# Patient Record
Sex: FEMALE | Race: WHITE | Employment: OTHER | URBAN - METROPOLITAN AREA
[De-identification: names, ages, dates, MRNs, and addresses within clinical notes are randomized per-mention and may not be internally consistent; named-entity substitution may affect disease eponyms.]

---

## 2018-07-27 ENCOUNTER — HOSPITAL ENCOUNTER (OUTPATIENT)
Dept: OTHER | Age: 53
Discharge: HOME | End: 2018-07-27
Attending: SURGERY | Admitting: SURGERY

## 2018-07-27 DIAGNOSIS — N13.0 ACQUIRED HYDRONEPHROSIS WITH URETEROPELVIC JUNCTION (UPJ) OBSTRUCTION: ICD-10-CM

## 2022-06-03 ENCOUNTER — HOSPITAL ENCOUNTER (EMERGENCY)
Age: 57
Discharge: HOME | End: 2022-06-03
Attending: EMERGENCY MEDICINE
Payer: MEDICAID

## 2022-06-03 VITALS
SYSTOLIC BLOOD PRESSURE: 119 MMHG | RESPIRATION RATE: 16 BRPM | DIASTOLIC BLOOD PRESSURE: 75 MMHG | HEART RATE: 68 BPM | OXYGEN SATURATION: 100 %
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
## 2022-06-03 DIAGNOSIS — S09.90XA CLOSED HEAD INJURY, INITIAL ENCOUNTER: Primary | ICD-10-CM

## 2022-06-03 PROCEDURE — 6360000002 HC RX W HCPCS: Performed by: PHYSICIAN ASSISTANT

## 2022-06-03 PROCEDURE — 90471 IMMUNIZATION ADMIN: CPT | Performed by: PHYSICIAN ASSISTANT

## 2022-06-03 PROCEDURE — 90715 TDAP VACCINE 7 YRS/> IM: CPT | Performed by: PHYSICIAN ASSISTANT

## 2022-06-03 PROCEDURE — 99284 EMERGENCY DEPT VISIT MOD MDM: CPT

## 2022-06-03 RX ADMIN — TETANUS TOXOID, REDUCED DIPHTHERIA TOXOID AND ACELLULAR PERTUSSIS VACCINE, ADSORBED 0.5 ML: 5; 2.5; 8; 8; 2.5 SUSPENSION INTRAMUSCULAR at 12:10

## 2022-06-03 ASSESSMENT — ENCOUNTER SYMPTOMS
DIARRHEA: 0
NAUSEA: 0
COUGH: 0
EYES NEGATIVE: 1
SHORTNESS OF BREATH: 0
ABDOMINAL PAIN: 0
CONSTIPATION: 0

## 2022-06-03 ASSESSMENT — PAIN - FUNCTIONAL ASSESSMENT
PAIN_FUNCTIONAL_ASSESSMENT: 0-10
PAIN_FUNCTIONAL_ASSESSMENT: NONE - DENIES PAIN

## 2022-06-03 ASSESSMENT — PAIN SCALES - GENERAL: PAINLEVEL_OUTOF10: 3

## 2022-06-03 ASSESSMENT — PAIN DESCRIPTION - LOCATION: LOCATION: HEAD

## 2022-06-03 ASSESSMENT — PAIN DESCRIPTION - DESCRIPTORS: DESCRIPTORS: PRESSURE
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
## 2023-02-02 ENCOUNTER — APPOINTMENT (OUTPATIENT)
Dept: CT IMAGING | Age: 58
End: 2023-02-02
Payer: MEDICAID

## 2023-02-02 ENCOUNTER — HOSPITAL ENCOUNTER (EMERGENCY)
Age: 58
Discharge: HOME | End: 2023-02-02
Attending: EMERGENCY MEDICINE
Payer: MEDICAID

## 2023-02-02 VITALS
RESPIRATION RATE: 16 BRPM | TEMPERATURE: 98.2 F | SYSTOLIC BLOOD PRESSURE: 111 MMHG | DIASTOLIC BLOOD PRESSURE: 68 MMHG | HEART RATE: 74 BPM | WEIGHT: 160 LBS | OXYGEN SATURATION: 99 % | BODY MASS INDEX: 22.4 KG/M2 | HEIGHT: 71 IN

## 2023-02-02 DIAGNOSIS — Q62.11 HYDRONEPHROSIS WITH URETEROPELVIC JUNCTION (UPJ) OBSTRUCTION: ICD-10-CM

## 2023-02-02 DIAGNOSIS — N13.30 BILATERAL HYDRONEPHROSIS: Primary | ICD-10-CM

## 2023-02-02 PROBLEM — M70.61 TROCHANTERIC BURSITIS OF RIGHT HIP: Status: ACTIVE | Noted: 2022-08-17

## 2023-02-02 PROBLEM — F51.04 CHRONIC INSOMNIA: Status: ACTIVE | Noted: 2019-09-10

## 2023-02-02 PROBLEM — N13.5 UPJ (URETEROPELVIC JUNCTION) OBSTRUCTION: Status: ACTIVE | Noted: 2019-09-10

## 2023-02-02 LAB
A/G RATIO: 2.1 (ref 1.1–2.2)
ALBUMIN SERPL-MCNC: 4.7 G/DL (ref 3.4–5)
ALP BLD-CCNC: 82 U/L (ref 40–129)
ALT SERPL-CCNC: 14 U/L (ref 10–40)
ANION GAP SERPL CALCULATED.3IONS-SCNC: 10 MMOL/L (ref 3–16)
AST SERPL-CCNC: 24 U/L (ref 15–37)
BASOPHILS ABSOLUTE: 0 K/UL (ref 0–0.2)
BASOPHILS RELATIVE PERCENT: 1 %
BILIRUB SERPL-MCNC: 0.5 MG/DL (ref 0–1)
BUN BLDV-MCNC: 10 MG/DL (ref 7–20)
CALCIUM SERPL-MCNC: 9.8 MG/DL (ref 8.3–10.6)
CHLORIDE BLD-SCNC: 103 MMOL/L (ref 99–110)
CO2: 29 MMOL/L (ref 21–32)
CREAT SERPL-MCNC: 0.8 MG/DL (ref 0.6–1.1)
EOSINOPHILS ABSOLUTE: 0.1 K/UL (ref 0–0.6)
EOSINOPHILS RELATIVE PERCENT: 1.8 %
GFR SERPL CREATININE-BSD FRML MDRD: >60 ML/MIN/{1.73_M2}
GLUCOSE BLD-MCNC: 89 MG/DL (ref 70–99)
HCT VFR BLD CALC: 37.5 % (ref 36–48)
HEMOGLOBIN: 12.9 G/DL (ref 12–16)
LIPASE: 31 U/L (ref 13–60)
LYMPHOCYTES ABSOLUTE: 1.2 K/UL (ref 1–5.1)
LYMPHOCYTES RELATIVE PERCENT: 38.4 %
MCH RBC QN AUTO: 29.7 PG (ref 26–34)
MCHC RBC AUTO-ENTMCNC: 34.4 G/DL (ref 31–36)
MCV RBC AUTO: 86.2 FL (ref 80–100)
MONOCYTES ABSOLUTE: 0.2 K/UL (ref 0–1.3)
MONOCYTES RELATIVE PERCENT: 6.8 %
NEUTROPHILS ABSOLUTE: 1.6 K/UL (ref 1.7–7.7)
NEUTROPHILS RELATIVE PERCENT: 52 %
PDW BLD-RTO: 14.5 % (ref 12.4–15.4)
PLATELET # BLD: 197 K/UL (ref 135–450)
PMV BLD AUTO: 8.4 FL (ref 5–10.5)
POTASSIUM REFLEX MAGNESIUM: 4 MMOL/L (ref 3.5–5.1)
RBC # BLD: 4.35 M/UL (ref 4–5.2)
SODIUM BLD-SCNC: 142 MMOL/L (ref 136–145)
TOTAL PROTEIN: 6.9 G/DL (ref 6.4–8.2)
WBC # BLD: 3.1 K/UL (ref 4–11)

## 2023-02-02 PROCEDURE — 74177 CT ABD & PELVIS W/CONTRAST: CPT

## 2023-02-02 PROCEDURE — 2580000003 HC RX 258: Performed by: EMERGENCY MEDICINE

## 2023-02-02 PROCEDURE — 80053 COMPREHEN METABOLIC PANEL: CPT

## 2023-02-02 PROCEDURE — A4216 STERILE WATER/SALINE, 10 ML: HCPCS | Performed by: EMERGENCY MEDICINE

## 2023-02-02 PROCEDURE — 2500000003 HC RX 250 WO HCPCS: Performed by: EMERGENCY MEDICINE

## 2023-02-02 PROCEDURE — 96374 THER/PROPH/DIAG INJ IV PUSH: CPT

## 2023-02-02 PROCEDURE — 6370000000 HC RX 637 (ALT 250 FOR IP): Performed by: EMERGENCY MEDICINE

## 2023-02-02 PROCEDURE — 6360000004 HC RX CONTRAST MEDICATION: Performed by: EMERGENCY MEDICINE

## 2023-02-02 PROCEDURE — 85025 COMPLETE CBC W/AUTO DIFF WBC: CPT

## 2023-02-02 PROCEDURE — 83690 ASSAY OF LIPASE: CPT

## 2023-02-02 PROCEDURE — 99285 EMERGENCY DEPT VISIT HI MDM: CPT

## 2023-02-02 RX ADMIN — FAMOTIDINE 20 MG: 10 INJECTION, SOLUTION INTRAVENOUS at 04:43

## 2023-02-02 RX ADMIN — Medication: at 04:43

## 2023-02-02 RX ADMIN — IOPAMIDOL 80 ML: 755 INJECTION, SOLUTION INTRAVENOUS at 06:29

## 2023-02-02 ASSESSMENT — ENCOUNTER SYMPTOMS
DIARRHEA: 0
VOMITING: 0
ABDOMINAL PAIN: 1
ABDOMINAL DISTENTION: 0
ANAL BLEEDING: 0
NAUSEA: 1

## 2023-02-02 ASSESSMENT — PAIN DESCRIPTION - LOCATION: LOCATION: ABDOMEN

## 2023-02-02 ASSESSMENT — PAIN - FUNCTIONAL ASSESSMENT: PAIN_FUNCTIONAL_ASSESSMENT: ACTIVITIES ARE NOT PREVENTED

## 2023-02-02 ASSESSMENT — PAIN DESCRIPTION - FREQUENCY: FREQUENCY: INTERMITTENT

## 2023-02-02 ASSESSMENT — PAIN DESCRIPTION - PAIN TYPE: TYPE: ACUTE PAIN

## 2023-02-02 ASSESSMENT — PAIN DESCRIPTION - DESCRIPTORS: DESCRIPTORS: ACHING

## 2023-02-02 ASSESSMENT — PAIN SCALES - GENERAL: PAINLEVEL_OUTOF10: 4

## 2023-02-02 NOTE — ED PROVIDER NOTES
[address] [facility]          ATTENDING PHYSICIAN NOTE       Date of evaluation: 2/2/2023    ADDENDUM:      Care of this patient was assumed from Dr. Jeane Hi. The patient was seen for Abdominal Pain  . The patient's initial evaluation and plan have been discussed with the prior provider who initially evaluated the patient. Nursing Notes, Past Medical Hx, Past Surgical Hx, Social Hx, Allergies, and Family Hx were all reviewed. Diagnostic Results     RADIOLOGY:  CT ABDOMEN PELVIS W IV CONTRAST Additional Contrast? None   Final Result   1. Bilateral hydronephrosis, right greater than left. Bilateral UPJ stenoses.           LABS:   Results for orders placed or performed during the hospital encounter of 02/02/23   CBC with Auto Differential   Result Value Ref Range    WBC 3.1 (L) 4.0 - 11.0 K/uL    RBC 4.35 4.00 - 5.20 M/uL    Hemoglobin 12.9 12.0 - 16.0 g/dL    Hematocrit 37.5 36.0 - 48.0 %    MCV 86.2 80.0 - 100.0 fL    MCH 29.7 26.0 - 34.0 pg    MCHC 34.4 31.0 - 36.0 g/dL    RDW 14.5 12.4 - 15.4 %    Platelets 834 598 - 084 K/uL    MPV 8.4 5.0 - 10.5 fL    Neutrophils % 52.0 %    Lymphocytes % 38.4 %    Monocytes % 6.8 %    Eosinophils % 1.8 %    Basophils % 1.0 %    Neutrophils Absolute 1.6 (L) 1.7 - 7.7 K/uL    Lymphocytes Absolute 1.2 1.0 - 5.1 K/uL    Monocytes Absolute 0.2 0.0 - 1.3 K/uL    Eosinophils Absolute 0.1 0.0 - 0.6 K/uL    Basophils Absolute 0.0 0.0 - 0.2 K/uL   CMP w/ Reflex to MG   Result Value Ref Range    Sodium 142 136 - 145 mmol/L    Potassium reflex Magnesium 4.0 3.5 - 5.1 mmol/L    Chloride 103 99 - 110 mmol/L    CO2 29 21 - 32 mmol/L    Anion Gap 10 3 - 16    Glucose 89 70 - 99 mg/dL    BUN 10 7 - 20 mg/dL    Creatinine 0.8 0.6 - 1.1 mg/dL    Est, Glom Filt Rate >60 >60    Calcium 9.8 8.3 - 10.6 mg/dL    Total Protein 6.9 6.4 - 8.2 g/dL    Albumin 4.7 3.4 - 5.0 g/dL    Albumin/Globulin Ratio 2.1 1.1 - 2.2    Total Bilirubin 0.5 0.0 - 1.0 mg/dL    Alkaline Phosphatase 82 40 - 129 U/L    ALT 14 10 - 40 U/L    AST 24 15 - 37 U/L   Lipase   Result Value Ref Range    Lipase 31.0 13.0 - 60.0 U/L       RECENT VITALS:  BP: 125/76, Temp: 98.2 °F (36.8 °C), Heart Rate: 71, Resp: 18, SpO2: 100 %     ED Course          The patient was given the following medications:  Orders Placed This Encounter   Medications    aluminum & magnesium hydroxide-simethicone (MAALOX) 30 mL, lidocaine viscous hcl (XYLOCAINE) 5 mL (GI COCKTAIL)    famotidine (PEPCID) 20 mg in sodium chloride (PF) 0.9 % 10 mL injection    iopamidol (ISOVUE-370) 76 % injection 80 mL       CONSULTS:  KAREN [address] AZALEA Hinkle / KALPANA / Benjamin Paulo is a 62 y.o. female with a history of congenital UPJ obstruction presenting with abdominal discomfort. On lab evaluation, the patient's kidney function is stable but on bedside ultrasound she was noted to have fairly significant hydronephrosis which has been a problem in the past.  CT scan was obtained since 1 had not been done since 2018 and she appears to have fairly significant right-sided hydronephrosis and lesser hydronephrosis on the left. The patient has not seen urology in quite some time but given these findings, urology was consulted here in order to define a follow-up plan. Patient was also advised to start Pepcid Complete for the epigastric discomfort given nothing else is turned up on her evaluation. Clinical Impression     1. Bilateral hydronephrosis    2.  Hydronephrosis with ureteropelvic junction (UPJ) obstruction        Disposition     PATIENT REFERRED TO:  [facility]  [address] [facility] [address]  [address] [facility] [address]  [phone]  Call today  to schedule followup appointment    DISCHARGE MEDICATIONS:  Current Discharge Medication List          DISPOSITION Decision To Discharge 02/02/2023 08:47:28 AM         Jai Guevara MD  02/02/23 6498

## 2023-02-02 NOTE — DISCHARGE INSTRUCTIONS
You may use over-the-counter Pepcid Complete or Gaviscon or Tums to see if that may help your upper stomach pain.

## 2023-02-02 NOTE — ED PROVIDER NOTES
[address] [facility]          ATTENDING PHYSICIAN NOTE       Date of evaluation: 2/2/2023    Chief Complaint     Abdominal Pain      History of Present Illness     Shira Ernandez is a 62 y.o. female who presents with a complaint of epigastric pain. Patient states the pain is been getting progressively worse over the last 3 days and is worse at night. She does not eat after about 5 PM and states it is worse when she tries to go to sleep at night and lays flat. It is improved with sitting up. She denies any radiation of the chest, back, or associated nausea or vomiting. She states it feels gnawing and burning. She has used NSAIDs occasionally but not regularly. She has no history of gastric ulcers, or peptic ulcer disease. She denies associated hematochezia, or melena, or history of GI bleeding. Pain does not radiate to her chest and is not worse with inspiration    ASSESSMENT / PLAN  (MEDICAL DECISION MAKING)     INITIAL VITALS: BP: 123/82, Temp: 98.2 °F (36.8 °C), Heart Rate: 64, Resp: 16, SpO2: 99 %      Shira Ernandez is a 62 y.o. female presenting with epigastric abdominal pain. Pain is not improved with GI cocktail. Bedside ultrasound of her gallbladder showed mildly distended gallbladder with no stones, but with a large amount of right kidney hydronephrosis. She does note she has had hydronephrosis bilaterally prior, but does not have any prior imaging available for comparison in our system. Given this could be a cause of her acute abdominal pain, she was sent for CT to evaluate for possible renal obstruction, which has demonstrated bilateral hydronephrosis without kidney stone. Symptoms were initially treated with Pepcid and GI cocktail with minimal relief. Lipase, CMP, and CBC were unremarkable. She was signed out to Dr. Dipika Abdul pending urology consult given the hydronephrosis noted on her CT scan.     Medical Decision Making  Amount and/or Complexity of Data Reviewed  Labs: ordered. Radiology: ordered. Risk  Prescription drug management. Clinical Impression     1. Bilateral hydronephrosis    2. Hydronephrosis with ureteropelvic junction (UPJ) obstruction        Disposition     PATIENT REFERRED TO:  [facility]  [address] [facility] [address]  [address] [facility] [address]  [phone]  Call today  to schedule followup appointment    DISCHARGE MEDICATIONS:  Discharge Medication List as of 2/2/2023  9:13 AM          DISPOSITION Decision To Discharge 02/02/2023 08:47:28 AM        Diagnostic Results and Other Data       RADIOLOGY:  CT ABDOMEN PELVIS W IV CONTRAST Additional Contrast? None   Final Result   1. Bilateral hydronephrosis, right greater than left. Bilateral UPJ stenoses.           LABS:   Results for orders placed or performed during the hospital encounter of 02/02/23   CBC with Auto Differential   Result Value Ref Range    WBC 3.1 (L) 4.0 - 11.0 K/uL    RBC 4.35 4.00 - 5.20 M/uL    Hemoglobin 12.9 12.0 - 16.0 g/dL    Hematocrit 37.5 36.0 - 48.0 %    MCV 86.2 80.0 - 100.0 fL    MCH 29.7 26.0 - 34.0 pg    MCHC 34.4 31.0 - 36.0 g/dL    RDW 14.5 12.4 - 15.4 %    Platelets 430 019 - 745 K/uL    MPV 8.4 5.0 - 10.5 fL    Neutrophils % 52.0 %    Lymphocytes % 38.4 %    Monocytes % 6.8 %    Eosinophils % 1.8 %    Basophils % 1.0 %    Neutrophils Absolute 1.6 (L) 1.7 - 7.7 K/uL    Lymphocytes Absolute 1.2 1.0 - 5.1 K/uL    Monocytes Absolute 0.2 0.0 - 1.3 K/uL    Eosinophils Absolute 0.1 0.0 - 0.6 K/uL    Basophils Absolute 0.0 0.0 - 0.2 K/uL   CMP w/ Reflex to MG   Result Value Ref Range    Sodium 142 136 - 145 mmol/L    Potassium reflex Magnesium 4.0 3.5 - 5.1 mmol/L    Chloride 103 99 - 110 mmol/L    CO2 29 21 - 32 mmol/L    Anion Gap 10 3 - 16    Glucose 89 70 - 99 mg/dL    BUN 10 7 - 20 mg/dL    Creatinine 0.8 0.6 - 1.1 mg/dL    Est, Glom Filt Rate >60 >60    Calcium 9.8 8.3 - 10.6 mg/dL    Total Protein 6.9 6.4 - 8.2 g/dL    Albumin 4.7 3.4 - 5.0 g/dL    Albumin/Globulin Ratio 2.1 1.1 - 2.2    Total Bilirubin 0.5 0.0 - 1.0 mg/dL    Alkaline Phosphatase 82 40 - 129 U/L    ALT 14 10 - 40 U/L    AST 24 15 - 37 U/L   Lipase   Result Value Ref Range    Lipase 31.0 13.0 - 60.0 U/L       ED BEDSIDE ULTRASOUND:  See EMR    MOST RECENT VITALS:  BP: 111/68,Temp: 98.2 °F (36.8 °C), Heart Rate: 74, Resp: 16, SpO2: 99 %     Procedures         ED Course     Nursing Notes, Past Medical Hx, Past Surgical Hx, Social Hx,Allergies, and Family Hx were reviewed. The patient was given the following medications:  Orders Placed This Encounter   Medications    aluminum & magnesium hydroxide-simethicone (MAALOX) 30 mL, lidocaine viscous hcl (XYLOCAINE) 5 mL (GI COCKTAIL)    famotidine (PEPCID) 20 mg in sodium chloride (PF) 0.9 % 10 mL injection    iopamidol (ISOVUE-370) 76 % injection 80 mL       CONSULTS:  IP CONSULT TO UROLOGY    Review of Systems     Review of Systems   Constitutional:  Negative for chills and fatigue. Cardiovascular:  Negative for chest pain. Gastrointestinal:  Positive for abdominal pain and nausea. Negative for abdominal distention, anal bleeding, diarrhea and vomiting. All other systems reviewed and are negative. Past Medical, Surgical, Family, and Social History     She has a past medical history of Bladder prolapse, female, acquired. She has no past surgical history on file. Her family history is not on file. She reports that she has never smoked. She does not have any smokeless tobacco history on file. She reports current drug use. Drug: Marijuana Delicia Thornton). She reports that she does not drink alcohol.     Medications     Discharge Medication List as of 2/2/2023  9:13 AM        CONTINUE these medications which have NOT CHANGED    Details   omeprazole (PRILOSEC) 20 MG delayed release capsule Take 1 capsule by mouth daily, Disp-28 capsule, R-0Print      Multiple Vitamins-Minerals (THERAPEUTIC MULTIVITAMIN-MINERALS) tablet Take 1 tablet by mouth daily      Probiotic Product (PROBIOTIC & ACIDOPHILUS EX ST PO) Take 1 tablet by mouth daily      L-Tryptophan 500 MG CAPS Take 1 capsule by mouth daily      ascorbic acid (VITAMIN C) 500 MG tablet Take 500 mg by mouth daily      vitamin B-12 (CYANOCOBALAMIN) 100 MCG tablet Take 50 mcg by mouth daily      aspirin-acetaminophen-caffeine (EXCEDRIN MIGRAINE) 250-250-65 MG per tablet Take 1 tablet by mouth every 6 hours as needed for Headaches             Allergies     She is allergic to benadryl [diphenhydramine]. Physical Exam     INITIAL VITALS: BP: 123/82, Temp: 98.2 °F (36.8 °C), Heart Rate: 64, Resp: 16, SpO2: 99 %   Physical Exam    Constitutional: Well nourished well appearing middle aged female who appears in no acute distress. HEENT: NC/AT. PERRL 4-2 bilaterally. EOMI. CV:Heart is regular rate and rhythm without murmurs, rubs or gallops. Resp: Respirations unlabored. Lungs clear to auscultation w/o wheezing. Abd: Soft, nontender, nondistended. No rebound or guarding noted. MSK: Full ROM, no edema or tenderness to palpation. Skin: Extremities are warm and well perfused. 2+ radial pulses . Cap Refill <3 seconds. Neuro: A&Ox3. Cranial nerves grossly intact   Strength and sensation intact x4 extremities. Psych:  Thought content, behavior & judgement normal.         Stevie Tran MD  02/02/23 [id number]

## 2023-07-15 ENCOUNTER — APPOINTMENT (OUTPATIENT)
Dept: GENERAL RADIOLOGY | Age: 58
End: 2023-07-15
Payer: MEDICAID

## 2023-07-15 ENCOUNTER — HOSPITAL ENCOUNTER (EMERGENCY)
Age: 58
Discharge: HOME | End: 2023-07-15
Attending: EMERGENCY MEDICINE
Payer: MEDICAID

## 2023-07-15 VITALS
HEART RATE: 70 BPM | OXYGEN SATURATION: 100 % | DIASTOLIC BLOOD PRESSURE: 73 MMHG | RESPIRATION RATE: 14 BRPM | TEMPERATURE: 98 F | HEIGHT: 71 IN | SYSTOLIC BLOOD PRESSURE: 117 MMHG | BODY MASS INDEX: 21.84 KG/M2 | WEIGHT: 156 LBS

## 2023-07-15 DIAGNOSIS — R07.9 CHEST PAIN, UNSPECIFIED TYPE: Primary | ICD-10-CM

## 2023-07-15 LAB
ANION GAP SERPL CALCULATED.3IONS-SCNC: 9 MMOL/L (ref 3–16)
BASOPHILS # BLD: 0 K/UL (ref 0–0.2)
BASOPHILS NFR BLD: 1.4 %
BUN SERPL-MCNC: 12 MG/DL (ref 7–20)
CALCIUM SERPL-MCNC: 9.5 MG/DL (ref 8.3–10.6)
CHLORIDE SERPL-SCNC: 101 MMOL/L (ref 99–110)
CO2 SERPL-SCNC: 26 MMOL/L (ref 21–32)
CREAT SERPL-MCNC: 0.6 MG/DL (ref 0.6–1.1)
D DIMER: 0.34 UG/ML FEU (ref 0–0.6)
DEPRECATED RDW RBC AUTO: 14.4 % (ref 12.4–15.4)
EKG ATRIAL RATE: 73 BPM
EKG DIAGNOSIS: NORMAL
EKG P AXIS: 70 DEGREES
EKG P-R INTERVAL: 220 MS
EKG Q-T INTERVAL: 394 MS
EKG QRS DURATION: 78 MS
EKG QTC CALCULATION (BAZETT): 434 MS
EKG R AXIS: 48 DEGREES
EKG T AXIS: 48 DEGREES
EKG VENTRICULAR RATE: 73 BPM
EOSINOPHIL # BLD: 0.1 K/UL (ref 0–0.6)
EOSINOPHIL NFR BLD: 1.9 %
GFR SERPLBLD CREATININE-BSD FMLA CKD-EPI: >60 ML/MIN/{1.73_M2}
GLUCOSE SERPL-MCNC: 93 MG/DL (ref 70–99)
HCT VFR BLD AUTO: 37.9 % (ref 36–48)
HGB BLD-MCNC: 13 G/DL (ref 12–16)
LYMPHOCYTES # BLD: 1.5 K/UL (ref 1–5.1)
LYMPHOCYTES NFR BLD: 45.3 %
MCH RBC QN AUTO: 29.6 PG (ref 26–34)
MCHC RBC AUTO-ENTMCNC: 34.4 G/DL (ref 31–36)
MCV RBC AUTO: 86.1 FL (ref 80–100)
MONOCYTES # BLD: 0.2 K/UL (ref 0–1.3)
MONOCYTES NFR BLD: 6.4 %
NEUTROPHILS # BLD: 1.5 K/UL (ref 1.7–7.7)
NEUTROPHILS NFR BLD: 45 %
PLATELET # BLD AUTO: 263 K/UL (ref 135–450)
PMV BLD AUTO: 8.4 FL (ref 5–10.5)
POTASSIUM SERPL-SCNC: 4 MMOL/L (ref 3.5–5.1)
RBC # BLD AUTO: 4.41 M/UL (ref 4–5.2)
SODIUM SERPL-SCNC: 136 MMOL/L (ref 136–145)
TROPONIN, HIGH SENSITIVITY: <6 NG/L (ref 0–14)
TROPONIN, HIGH SENSITIVITY: <6 NG/L (ref 0–14)
WBC # BLD AUTO: 3.2 K/UL (ref 4–11)

## 2023-07-15 PROCEDURE — 85379 FIBRIN DEGRADATION QUANT: CPT

## 2023-07-15 PROCEDURE — 84484 ASSAY OF TROPONIN QUANT: CPT

## 2023-07-15 PROCEDURE — 93005 ELECTROCARDIOGRAM TRACING: CPT | Performed by: EMERGENCY MEDICINE

## 2023-07-15 PROCEDURE — 6360000002 HC RX W HCPCS: Performed by: EMERGENCY MEDICINE

## 2023-07-15 PROCEDURE — 99285 EMERGENCY DEPT VISIT HI MDM: CPT

## 2023-07-15 PROCEDURE — 85025 COMPLETE CBC W/AUTO DIFF WBC: CPT

## 2023-07-15 PROCEDURE — 36415 COLL VENOUS BLD VENIPUNCTURE: CPT

## 2023-07-15 PROCEDURE — 80048 BASIC METABOLIC PNL TOTAL CA: CPT

## 2023-07-15 PROCEDURE — 96374 THER/PROPH/DIAG INJ IV PUSH: CPT

## 2023-07-15 PROCEDURE — 71046 X-RAY EXAM CHEST 2 VIEWS: CPT

## 2023-07-15 RX ORDER — ASPIRIN 81 MG/1
324 TABLET, CHEWABLE ORAL ONCE
Status: DISCONTINUED | OUTPATIENT
Start: 2023-07-15 | End: 2023-07-15 | Stop reason: HOSPADM

## 2023-07-15 RX ORDER — KETOROLAC TROMETHAMINE 30 MG/ML
15 INJECTION, SOLUTION INTRAMUSCULAR; INTRAVENOUS ONCE
Status: COMPLETED | OUTPATIENT
Start: 2023-07-15 | End: 2023-07-15

## 2023-07-15 RX ADMIN — KETOROLAC TROMETHAMINE 15 MG: 30 INJECTION, SOLUTION INTRAMUSCULAR; INTRAVENOUS at 10:45

## 2023-07-15 ASSESSMENT — ENCOUNTER SYMPTOMS
SHORTNESS OF BREATH: 0
VOMITING: 0
ABDOMINAL PAIN: 0
EYE PAIN: 0
WHEEZING: 0
DIARRHEA: 0
COUGH: 0
NAUSEA: 0

## 2023-07-15 ASSESSMENT — PAIN SCALES - GENERAL
PAINLEVEL_OUTOF10: 5

## 2023-07-15 ASSESSMENT — PAIN - FUNCTIONAL ASSESSMENT: PAIN_FUNCTIONAL_ASSESSMENT: 0-10

## 2023-07-15 ASSESSMENT — PAIN DESCRIPTION - LOCATION
LOCATION: CHEST;BACK
LOCATION: CHEST;BACK

## 2023-07-15 ASSESSMENT — PAIN DESCRIPTION - DESCRIPTORS
DESCRIPTORS: ACHING
DESCRIPTORS: DULL;ACHING

## 2023-07-15 ASSESSMENT — PAIN DESCRIPTION - ORIENTATION
ORIENTATION: LEFT;UPPER
ORIENTATION: LEFT;UPPER

## 2023-07-15 NOTE — ED NOTES
Pt condition and vital signs stable at discharge, pt verbalized understanding of discharge instructions.       [place], [place]  07/15/23 0076

## 2024-02-04 ENCOUNTER — APPOINTMENT (OUTPATIENT)
Dept: GENERAL RADIOLOGY | Age: 59
End: 2024-02-04
Payer: MEDICAID

## 2024-02-04 ENCOUNTER — APPOINTMENT (OUTPATIENT)
Dept: CT IMAGING | Age: 59
End: 2024-02-04
Payer: MEDICAID

## 2024-02-04 ENCOUNTER — ANCILLARY PROCEDURE (OUTPATIENT)
Dept: EMERGENCY DEPT | Age: 59
End: 2024-02-04
Attending: STUDENT IN AN ORGANIZED HEALTH CARE EDUCATION/TRAINING PROGRAM
Payer: MEDICAID

## 2024-02-04 ENCOUNTER — HOSPITAL ENCOUNTER (EMERGENCY)
Age: 59
Discharge: HOME | End: 2024-02-04
Attending: STUDENT IN AN ORGANIZED HEALTH CARE EDUCATION/TRAINING PROGRAM
Payer: MEDICAID

## 2024-02-04 VITALS
RESPIRATION RATE: 18 BRPM | HEART RATE: 83 BPM | SYSTOLIC BLOOD PRESSURE: 118 MMHG | OXYGEN SATURATION: 95 % | DIASTOLIC BLOOD PRESSURE: 72 MMHG | WEIGHT: 160 LBS | TEMPERATURE: 99.1 F | BODY MASS INDEX: 22.32 KG/M2

## 2024-02-04 DIAGNOSIS — J10.1 INFLUENZA A: Primary | ICD-10-CM

## 2024-02-04 LAB
ANION GAP SERPL CALCULATED.3IONS-SCNC: 12 MMOL/L (ref 3–16)
BASOPHILS # BLD: 0 K/UL (ref 0–0.2)
BASOPHILS NFR BLD: 0.6 %
BILIRUB UR QL STRIP.AUTO: ABNORMAL
BUN SERPL-MCNC: 10 MG/DL (ref 7–20)
CALCIUM SERPL-MCNC: 9.1 MG/DL (ref 8.3–10.6)
CHLORIDE SERPL-SCNC: 93 MMOL/L (ref 99–110)
CLARITY UR: CLEAR
CO2 SERPL-SCNC: 25 MMOL/L (ref 21–32)
COLOR UR: YELLOW
CREAT SERPL-MCNC: 0.8 MG/DL (ref 0.6–1.1)
DEPRECATED RDW RBC AUTO: 14.6 % (ref 12.4–15.4)
EOSINOPHIL # BLD: 0 K/UL (ref 0–0.6)
EOSINOPHIL NFR BLD: 0.3 %
FLUAV RNA RESP QL NAA+PROBE: DETECTED
FLUBV RNA RESP QL NAA+PROBE: NOT DETECTED
GFR SERPLBLD CREATININE-BSD FMLA CKD-EPI: >60 ML/MIN/{1.73_M2}
GLUCOSE SERPL-MCNC: 93 MG/DL (ref 70–99)
GLUCOSE UR STRIP.AUTO-MCNC: NEGATIVE MG/DL
HCT VFR BLD AUTO: 39.1 % (ref 36–48)
HGB BLD-MCNC: 13.3 G/DL (ref 12–16)
HGB UR QL STRIP.AUTO: NEGATIVE
KETONES UR STRIP.AUTO-MCNC: >=160 MG/DL
LEUKOCYTE ESTERASE UR QL STRIP.AUTO: NEGATIVE
LYMPHOCYTES # BLD: 0.5 K/UL (ref 1–5.1)
LYMPHOCYTES NFR BLD: 17 %
MCH RBC QN AUTO: 29.5 PG (ref 26–34)
MCHC RBC AUTO-ENTMCNC: 34.1 G/DL (ref 31–36)
MCV RBC AUTO: 86.5 FL (ref 80–100)
MONOCYTES # BLD: 0.2 K/UL (ref 0–1.3)
MONOCYTES NFR BLD: 6.8 %
NEUTROPHILS # BLD: 2 K/UL (ref 1.7–7.7)
NEUTROPHILS NFR BLD: 75.3 %
NITRITE UR QL STRIP.AUTO: NEGATIVE
PH UR STRIP.AUTO: 6 [PH] (ref 5–8)
PLATELET # BLD AUTO: 174 K/UL (ref 135–450)
PMV BLD AUTO: 8.7 FL (ref 5–10.5)
POTASSIUM SERPL-SCNC: 3.8 MMOL/L (ref 3.5–5.1)
PROT UR STRIP.AUTO-MCNC: NEGATIVE MG/DL
RBC # BLD AUTO: 4.52 M/UL (ref 4–5.2)
SARS-COV-2 RNA RESP QL NAA+PROBE: NOT DETECTED
SODIUM SERPL-SCNC: 130 MMOL/L (ref 136–145)
SP GR UR STRIP.AUTO: 1.02 (ref 1–1.03)
UA COMPLETE W REFLEX CULTURE PNL UR: ABNORMAL
UA DIPSTICK W REFLEX MICRO PNL UR: ABNORMAL
URN SPEC COLLECT METH UR: ABNORMAL
UROBILINOGEN UR STRIP-ACNC: 0.2 E.U./DL
WBC # BLD AUTO: 2.7 K/UL (ref 4–11)

## 2024-02-04 PROCEDURE — 2580000003 HC RX 258: Performed by: STUDENT IN AN ORGANIZED HEALTH CARE EDUCATION/TRAINING PROGRAM

## 2024-02-04 PROCEDURE — 81003 URINALYSIS AUTO W/O SCOPE: CPT

## 2024-02-04 PROCEDURE — 6370000000 HC RX 637 (ALT 250 FOR IP): Performed by: STUDENT IN AN ORGANIZED HEALTH CARE EDUCATION/TRAINING PROGRAM

## 2024-02-04 PROCEDURE — 80048 BASIC METABOLIC PNL TOTAL CA: CPT

## 2024-02-04 PROCEDURE — 99284 EMERGENCY DEPT VISIT MOD MDM: CPT

## 2024-02-04 PROCEDURE — 87636 SARSCOV2 & INF A&B AMP PRB: CPT

## 2024-02-04 PROCEDURE — 71046 X-RAY EXAM CHEST 2 VIEWS: CPT

## 2024-02-04 PROCEDURE — 85025 COMPLETE CBC W/AUTO DIFF WBC: CPT

## 2024-02-04 PROCEDURE — 76775 US EXAM ABDO BACK WALL LIM: CPT

## 2024-02-04 RX ORDER — SODIUM CHLORIDE, SODIUM LACTATE, POTASSIUM CHLORIDE, AND CALCIUM CHLORIDE .6; .31; .03; .02 G/100ML; G/100ML; G/100ML; G/100ML
1000 INJECTION, SOLUTION INTRAVENOUS ONCE
Status: COMPLETED | OUTPATIENT
Start: 2024-02-04 | End: 2024-02-04

## 2024-02-04 RX ORDER — ACETAMINOPHEN 500 MG
1000 TABLET ORAL ONCE
Status: COMPLETED | OUTPATIENT
Start: 2024-02-04 | End: 2024-02-04

## 2024-02-04 RX ADMIN — ACETAMINOPHEN 1000 MG: 500 TABLET ORAL at 11:26

## 2024-02-04 RX ADMIN — SODIUM CHLORIDE, SODIUM LACTATE, POTASSIUM CHLORIDE, AND CALCIUM CHLORIDE 1000 ML: .6; .31; .03; .02 INJECTION, SOLUTION INTRAVENOUS at 11:36

## 2024-02-04 NOTE — DISCHARGE INSTRUCTIONS
CDC guidelines recommend that you isolate until 24 hours after your fever clears.  Please use Tylenol Motrin at home for your fever and please keep yourself well-hydrated.

## 2024-02-04 NOTE — ED TRIAGE NOTES
Patient presents to ED with concerns for kidney issues, states \"my kidneys are hurting and I have a history of hydronephrosis\". States daughter and her children as well as her self were diagnosed with COVID \"several weeks ago\". States it came on Friday \"very suddenly, like I was having an allergic reaction\"

## 2024-02-04 NOTE — ED NOTES
Patient prepared for and ready to be discharged. Patient discharged at this time in no acute distress after verbalizing understanding of discharge instructions. Patient left after receiving After Visit Summary instructions.       Juan Manuel Elizalde RN  02/04/24 [id number]

## 2024-02-04 NOTE — ED PROVIDER NOTES
[facility]  EMERGENCY DEPARTMENT ENCOUNTER          ATTENDING PHYSICIAN NOTE       Date of evaluation: 2/4/2024    Chief Complaint     Pharyngitis and Fever (102.1)      History of Present Illness     Aiyana Evans is a 58 y.o. female who presents with cough, headache, sore throat, back pain for the past 2 days.  Patient reports that she was out to dinner a couple days ago when she developed the symptoms.  She also states that she had a fever of 102 at home.  States that she has a history of kidney problems and states that her kidneys are hurting but she is not having any hematuria, dysuria, decreased urine output at this time.  She states that she had COVID last month.  She does not currently have any sick contacts.      Physical Exam     INITIAL VITALS: BP: (!) 150/85, Temp: (!) 100.8 °F (38.2 °C), Pulse: (!) 105, Respirations: 19, SpO2: 97 %   Physical Exam  Vitals and nursing note reviewed.   Constitutional:       General: She is not in acute distress.     Appearance: Normal appearance. She is normal weight.   HENT:      Head: Normocephalic and atraumatic.   Cardiovascular:      Rate and Rhythm: Tachycardia present.   Abdominal:      General: Abdomen is flat.      Tenderness: There is no abdominal tenderness. There is no guarding or rebound.   Skin:     General: Skin is warm and dry.   Neurological:      General: No focal deficit present.      Mental Status: She is alert. Mental status is at baseline.   Psychiatric:         Mood and Affect: Mood normal.         Behavior: Behavior normal.         ASSESSMENT / PLAN  (MEDICAL DECISION MAKING)     INITIAL VITALS: BP: (!) 150/85, Temp: (!) 100.8 °F (38.2 °C), Pulse: (!) 105, Respirations: 19, SpO2: 97 %      Aiyana Evans is a 58 y.o. female who presents with fever, cough, headache, back pain.  Patient is flu positive.  Chest x-ray, CBC, BMP are unremarkable.  Patient reports that she has a history of hydronephrosis I performed bedside ultrasound which did not

## 2024-02-08 RX ORDER — ALBUTEROL SULFATE 2.5 MG/3ML
SOLUTION RESPIRATORY (INHALATION)
Status: DISPENSED
Start: 2024-02-08 | End: 2024-02-08

## 2024-05-06 ENCOUNTER — HOSPITAL ENCOUNTER (EMERGENCY)
Age: 59
Discharge: HOME | End: 2024-05-06
Payer: MEDICAID

## 2024-05-06 VITALS
DIASTOLIC BLOOD PRESSURE: 83 MMHG | BODY MASS INDEX: 22.87 KG/M2 | WEIGHT: 163.36 LBS | SYSTOLIC BLOOD PRESSURE: 148 MMHG | OXYGEN SATURATION: 100 % | HEIGHT: 71 IN | HEART RATE: 78 BPM | RESPIRATION RATE: 16 BRPM | TEMPERATURE: 97.9 F

## 2024-05-06 DIAGNOSIS — S00.03XA CONTUSION OF SCALP, INITIAL ENCOUNTER: Primary | ICD-10-CM

## 2024-05-06 PROCEDURE — 99282 EMERGENCY DEPT VISIT SF MDM: CPT

## 2024-05-06 RX ORDER — MAGNESIUM OXIDE 400 MG/1
400 TABLET ORAL 2 TIMES DAILY
COMMUNITY

## 2024-05-06 ASSESSMENT — PAIN DESCRIPTION - PAIN TYPE: TYPE: ACUTE PAIN

## 2024-05-06 ASSESSMENT — PAIN - FUNCTIONAL ASSESSMENT: PAIN_FUNCTIONAL_ASSESSMENT: 0-10

## 2024-05-06 ASSESSMENT — ENCOUNTER SYMPTOMS
RHINORRHEA: 0
SHORTNESS OF BREATH: 0
SORE THROAT: 0
ABDOMINAL PAIN: 0
VOMITING: 0
COUGH: 0
NAUSEA: 0
PHOTOPHOBIA: 1

## 2024-05-06 ASSESSMENT — PAIN DESCRIPTION - FREQUENCY: FREQUENCY: CONTINUOUS

## 2024-05-06 ASSESSMENT — PAIN DESCRIPTION - ONSET: ONSET: SUDDEN

## 2024-05-06 ASSESSMENT — PAIN DESCRIPTION - LOCATION: LOCATION: HEAD

## 2024-05-06 ASSESSMENT — PAIN DESCRIPTION - DESCRIPTORS: DESCRIPTORS: PRESSURE

## 2024-05-06 ASSESSMENT — PAIN SCALES - GENERAL: PAINLEVEL_OUTOF10: 2

## 2024-05-06 NOTE — ED PROVIDER NOTES
[facility]  EMERGENCY DEPARTMENT ENCOUNTER          PHYSICIAN ASSISTANT NOTE       Date of evaluation: 5/6/2024    Chief Complaint     Head Injury (Sat hit in the head with a beer hayward no LOC today pressure in eyes and light sensitivity)      History of Present Illness     Aiyana Evans is a 58 y.o. female, otherwise healthy, who presents to the ED following a minor head injury 2 days ago.  The patient states she was standing under a shelf when her client's cat jumped from the shelf to the nearby refrigerator causing beer yesy to fall off the counter and onto the top of her head.  She did not lose consciousness but reports pain initially.  No break in the skin.  She felt okay yesterday but today started noticing mild headache at the top of her head as well as photophobia causing her to wear her sunglasses even though it is a cloudy day.  She reports aching discomfort with movement of her eyes and when she is under more stress.  Has had no nausea or vomiting.  Rates her pain a 2 out of 10 in severity.  She otherwise has no complaints.    ASSESSMENT / PLAN  (MEDICAL DECISION MAKING)     INITIAL VITALS: BP: (!) 148/83, Temp: 97.9 °F (36.6 °C), Pulse: 78, Respirations: 16, SpO2: 100 %    Aiyana Evans is a 58 y.o. female presenting following a mild head injury with signs of mild concussion, no neurologic deficits on exam.  She does not meet criteria for CT imaging.  The patient is reassured.  She is encouraged to take Tylenol as needed for headache and push fluids.  She was given 3 days off work and recommended brain rest.  She is given additional head injury instructions and will follow-up with her primary care provider in 3 days if no improvement or return for worsening.      Medical Decision Making  Problems Addressed:  Contusion of scalp, initial encounter: acute illness or injury    Risk  OTC drugs.      Clinical Impression     1. Contusion of scalp, initial encounter        Disposition     PATIENT

## 2024-05-11 ENCOUNTER — HOSPITAL ENCOUNTER (EMERGENCY)
Age: 59
Discharge: HOME | End: 2024-05-11
Attending: EMERGENCY MEDICINE
Payer: MEDICAID

## 2024-05-11 ENCOUNTER — APPOINTMENT (OUTPATIENT)
Dept: CT IMAGING | Age: 59
End: 2024-05-11
Payer: MEDICAID

## 2024-05-11 VITALS
DIASTOLIC BLOOD PRESSURE: 57 MMHG | HEART RATE: 76 BPM | OXYGEN SATURATION: 96 % | TEMPERATURE: 98 F | BODY MASS INDEX: 22.75 KG/M2 | RESPIRATION RATE: 17 BRPM | SYSTOLIC BLOOD PRESSURE: 101 MMHG | WEIGHT: 163.14 LBS

## 2024-05-11 DIAGNOSIS — R51.9 NONINTRACTABLE EPISODIC HEADACHE, UNSPECIFIED HEADACHE TYPE: Primary | ICD-10-CM

## 2024-05-11 PROCEDURE — 70450 CT HEAD/BRAIN W/O DYE: CPT

## 2024-05-11 PROCEDURE — 2580000003 HC RX 258: Performed by: STUDENT IN AN ORGANIZED HEALTH CARE EDUCATION/TRAINING PROGRAM

## 2024-05-11 PROCEDURE — 6360000002 HC RX W HCPCS: Performed by: STUDENT IN AN ORGANIZED HEALTH CARE EDUCATION/TRAINING PROGRAM

## 2024-05-11 PROCEDURE — 99284 EMERGENCY DEPT VISIT MOD MDM: CPT

## 2024-05-11 PROCEDURE — 96372 THER/PROPH/DIAG INJ SC/IM: CPT

## 2024-05-11 PROCEDURE — 93005 ELECTROCARDIOGRAM TRACING: CPT | Performed by: STUDENT IN AN ORGANIZED HEALTH CARE EDUCATION/TRAINING PROGRAM

## 2024-05-11 PROCEDURE — 6370000000 HC RX 637 (ALT 250 FOR IP): Performed by: STUDENT IN AN ORGANIZED HEALTH CARE EDUCATION/TRAINING PROGRAM

## 2024-05-11 PROCEDURE — 96374 THER/PROPH/DIAG INJ IV PUSH: CPT

## 2024-05-11 RX ORDER — PROCHLORPERAZINE EDISYLATE 5 MG/ML
5 INJECTION INTRAMUSCULAR; INTRAVENOUS ONCE
Status: COMPLETED | OUTPATIENT
Start: 2024-05-11 | End: 2024-05-11

## 2024-05-11 RX ORDER — KETOROLAC TROMETHAMINE 30 MG/ML
15 INJECTION, SOLUTION INTRAMUSCULAR; INTRAVENOUS ONCE
Status: COMPLETED | OUTPATIENT
Start: 2024-05-11 | End: 2024-05-11

## 2024-05-11 RX ORDER — BUTALBITAL, ACETAMINOPHEN AND CAFFEINE 50; 325; 40 MG/1; MG/1; MG/1
1 TABLET ORAL EVERY 4 HOURS PRN
Qty: 15 TABLET | Refills: 0 | Status: SHIPPED | OUTPATIENT
Start: 2024-05-11

## 2024-05-11 RX ORDER — SODIUM CHLORIDE, SODIUM LACTATE, POTASSIUM CHLORIDE, AND CALCIUM CHLORIDE .6; .31; .03; .02 G/100ML; G/100ML; G/100ML; G/100ML
500 INJECTION, SOLUTION INTRAVENOUS ONCE
Status: COMPLETED | OUTPATIENT
Start: 2024-05-11 | End: 2024-05-11

## 2024-05-11 RX ORDER — BUTALBITAL, ACETAMINOPHEN AND CAFFEINE 50; 325; 40 MG/1; MG/1; MG/1
1 TABLET ORAL ONCE
Status: COMPLETED | OUTPATIENT
Start: 2024-05-11 | End: 2024-05-11

## 2024-05-11 RX ADMIN — SODIUM CHLORIDE, POTASSIUM CHLORIDE, SODIUM LACTATE AND CALCIUM CHLORIDE 500 ML: 600; 310; 30; 20 INJECTION, SOLUTION INTRAVENOUS at 12:24

## 2024-05-11 RX ADMIN — KETOROLAC TROMETHAMINE 15 MG: 30 INJECTION, SOLUTION INTRAMUSCULAR; INTRAVENOUS at 12:17

## 2024-05-11 RX ADMIN — PROCHLORPERAZINE EDISYLATE 5 MG: 5 INJECTION INTRAMUSCULAR; INTRAVENOUS at 12:18

## 2024-05-11 RX ADMIN — BUTALBITAL, ACETAMINOPHEN, AND CAFFEINE 1 TABLET: 50; 325; 40 TABLET ORAL at 13:16

## 2024-05-11 ASSESSMENT — PAIN DESCRIPTION - DESCRIPTORS
DESCRIPTORS: ACHING;DULL
DESCRIPTORS: PRESSURE

## 2024-05-11 ASSESSMENT — PAIN SCALES - GENERAL
PAINLEVEL_OUTOF10: 2
PAINLEVEL_OUTOF10: 4

## 2024-05-11 ASSESSMENT — PAIN DESCRIPTION - ORIENTATION
ORIENTATION: ANTERIOR
ORIENTATION: ANTERIOR

## 2024-05-11 ASSESSMENT — PAIN DESCRIPTION - LOCATION
LOCATION: HEAD
LOCATION: HEAD;JAW

## 2024-05-11 NOTE — ED PROVIDER NOTES
ED Attending Attestation Note     Date of evaluation: 5/11/2024    This patient was seen by the resident.  I have seen and examined the patient, agree with the workup, evaluation, management and diagnosis. The care plan has been discussed.  My assessment reveals is awake and alert, ambulatory without difficulty.  Face symmetric, speech clear, no meningismus.       Slick Urbano MD  05/11/24 0761

## 2024-05-11 NOTE — ED PROVIDER NOTES
[facility]  EMERGENCY DEPARTMENT ENCOUNTER          EM RESIDENT NOTE       Date of evaluation: 5/11/2024    Chief Complaint     Headache (Headache since Saturday, worse today, states she thinks she took \"too much iodine\" and she had a \"head injury\" last week where a beer hayward fell on her head)      History of Present Illness     Aiyana Evans is a 58 y.o. female who presents with a headache. She states that she woke up this morning with a headache, as she has since her injury last week. She states that the headache was worse today than it has been before. It is a holocephalic headache that is throbbing, worse when laying flat (pedro luis in morning), and associated with nausea, photophobia and phonophobia. She also felt like she \"spaced out\" this morning while in the bath (did not syncopize) and was cognitively foggy. Family has not commented on any confusion or abnormal behavior. She denies any vomiting, numbness, weakness, dyscoordination, visual changes, hearing changes. She took an excedrine migraine this morning.     The patient thinks that this morning she may have accidentally taken a full eyedropper of iodine rather than the usual 2 drops because she takes a full eye dropper of a different supplement, but she is unsure of this. She denies any irritation of the mouth, throat, dyspepsia, abdominal pain. She does not have a metallic taste in her mouth.     MEDICAL DECISION MAKING / ASSESSMENT / PLAN     INITIAL VITALS: BP: (!) 141/76, Temp: 98 °F (36.7 °C), Pulse: 90,  , SpO2: 100 %    Aiyana Evans is a 58 y.o. female presenting with a headache one week after a low mechanism head injury (see note dated 5/6/24), without any neurologic abnormalities on exam. Although her symptoms are most consistent with a post-concussive headache, the history of HA worse when laying flat after a head injury could be concerning for increased ICP secondary to ICH such as SDH thus non contrasted head CT will be done to evaluate  for this or any other secondary signs of increased ICP. Symptoms were treated as listed below with significant improvement. Patient has follow-up at [facility] for post-concussion syndrome. Advised on slow return to activities and given work note.    Regarding the possible iodine overdose, given that the patient is now 6 hours from potential ingestion without any symptoms and based on below, this does not merit any further observation or workup.    ED Course as of 05/11/24 1407   Sat May 11, 2024   1204 Discussed w [facility] who calculated if the patient had taken a dropper which was ~1 teaspoon amount the amount of potassium iodide an iodine would be in a low enough amount that she should not have any symptoms. Possibly this could cause parotitis or angioedema, neither of which the patient is experiencing [MW]      ED Course User Index  [MW] Sinai Clarke MD       Is this patient to be included in the SEP-1 core measure?No Exclusion criteria - the patient is NOT to be included for SEP-1 Core Measure due to: Infection is not suspected    Medical Decision Making  Amount and/or Complexity of Data Reviewed  Radiology: ordered.  ECG/medicine tests: ordered.    Risk  Prescription drug management.      This patient was also evaluated by the attending physician. All care plans werediscussed and agreed upon.    Clinical Impression     1. Nonintractable episodic headache, unspecified headache type        Disposition     PATIENT REFERRED TO:  No follow-up provider specified.    DISCHARGE MEDICATIONS:  New Prescriptions    BUTALBITAL-ACETAMINOPHEN-CAFFEINE (FIORICET, ESGIC) -40 MG PER TABLET    Take 1 tablet by mouth every 4 hours as needed for Headaches       DISPOSITION Decision To Discharge 05/11/2024 01:52:41 PM        Diagnostic Results and Other Data     RADIOLOGY:  CT HEAD WO CONTRAST   Final Result      1.  No acute intracranial abnormality.             LABS:   No results found for this visit on 05/11/24.  EKG

## 2024-05-12 LAB
EKG ATRIAL RATE: 76 BPM
EKG DIAGNOSIS: NORMAL
EKG P AXIS: 54 DEGREES
EKG P-R INTERVAL: 186 MS
EKG Q-T INTERVAL: 388 MS
EKG QRS DURATION: 68 MS
EKG QTC CALCULATION (BAZETT): 436 MS
EKG R AXIS: 70 DEGREES
EKG T AXIS: 63 DEGREES
EKG VENTRICULAR RATE: 76 BPM